# Patient Record
Sex: MALE | Race: WHITE | URBAN - METROPOLITAN AREA
[De-identification: names, ages, dates, MRNs, and addresses within clinical notes are randomized per-mention and may not be internally consistent; named-entity substitution may affect disease eponyms.]

---

## 2017-01-26 ENCOUNTER — TELEPHONE (OUTPATIENT)
Dept: FAMILY MEDICINE CLINIC | Age: 27
End: 2017-01-26

## 2017-01-26 ENCOUNTER — OFFICE VISIT (OUTPATIENT)
Dept: FAMILY MEDICINE CLINIC | Age: 27
End: 2017-01-26

## 2017-01-26 VITALS
HEIGHT: 71 IN | OXYGEN SATURATION: 98 % | WEIGHT: 173.6 LBS | DIASTOLIC BLOOD PRESSURE: 67 MMHG | RESPIRATION RATE: 18 BRPM | HEART RATE: 58 BPM | SYSTOLIC BLOOD PRESSURE: 114 MMHG | TEMPERATURE: 97.7 F | BODY MASS INDEX: 24.3 KG/M2

## 2017-01-26 DIAGNOSIS — E29.1 HYPOGONADISM MALE: ICD-10-CM

## 2017-01-26 DIAGNOSIS — F90.9 ATTENTION DEFICIT HYPERACTIVITY DISORDER (ADHD), UNSPECIFIED ADHD TYPE: Primary | ICD-10-CM

## 2017-01-26 RX ORDER — CLOMIPHENE CITRATE 50 MG/1
25 TABLET ORAL EVERY OTHER DAY
Qty: 15 TAB | Refills: 3 | Status: SHIPPED | OUTPATIENT
Start: 2017-01-26 | End: 2017-02-23

## 2017-01-26 RX ORDER — DEXTROAMPHETAMINE SACCHARATE, AMPHETAMINE ASPARTATE MONOHYDRATE, DEXTROAMPHETAMINE SULFATE AND AMPHETAMINE SULFATE 5; 5; 5; 5 MG/1; MG/1; MG/1; MG/1
20 CAPSULE, EXTENDED RELEASE ORAL
Qty: 30 CAP | Refills: 0 | Status: SHIPPED | OUTPATIENT
Start: 2017-01-26 | End: 2017-02-23 | Stop reason: SDUPTHER

## 2017-01-26 NOTE — PATIENT INSTRUCTIONS
To Do: · Start you Adderall XR on a daily basis  · Take note with regards to:  · How effective it is overall  · How long the effectiveness lasts      Notes from your doctor: You signed a controlled substances agreement during your visit, and a copy of the signed document was given to you for your personal records. Attention Deficit Hyperactivity Disorder (ADHD) in Adults: Care Instructions  Your Care Instructions  Attention deficit hyperactivity disorder, or ADHD, is a condition that makes it hard to pay attention. So you may have problems when you try to focus, get organized, and finish tasks. It might make you more active than other people. Or you might do things without thinking first.  ADHD is very common. It usually starts in early childhood. Many adults don't realize they have it until their children are diagnosed. Then they become aware of their own symptoms. Doctors don't know what causes ADHD. But it often runs in families. ADHD can be treated with medicines, behavior training, and counseling. Treatment can improve your life. Follow-up care is a key part of your treatment and safety. Be sure to make and go to all appointments, and call your doctor if you are having problems. It's also a good idea to know your test results and keep a list of the medicines you take. How can you care for yourself at home? · Learn all you can about ADHD. This will help you and your family understand it better. · Take your medicines exactly as prescribed. Call your doctor if you think you are having a problem with your medicine. You will get more details on the specific medicines your doctor prescribes. · If you miss a dose of your medicine, do not take an extra dose. · If your doctor suggests counseling, find a counselor you like and trust. Talk openly and honestly. Be willing to make some changes. · Find a support group for adults with ADHD.  Talking to others with the same problems can help you feel better. It can also give you ideas about how to best cope with the condition. · Get rid of distractions at your work space. Keep your desk clean. Try not to face a window or busy hallway. · Use files, planners, and other tools to keep you organized. · Limit use of alcohol, and do not use illegal drugs. People with ADHD tend to become addicted more easily than others. Tell your doctor if you need help to quit. Counseling, support groups, and sometimes medicines can help you stay free of alcohol or drugs. · Get at least 30 minutes of physical activity on most days of the week. Exercise has been shown to help people cope with ADHD. Walking is a good choice. You also may want to do other activities, such as running, swimming, cycling, or playing tennis or team sports. When should you call for help? Watch closely for changes in your health, and be sure to contact your doctor if:  · You feel sad a lot or cry all the time. · You have trouble sleeping, or you sleep too much. · You find it hard to concentrate, make decisions, or remember things. · You change how you normally eat. · You feel guilty for no reason. Where can you learn more? Go to http://luis-liset.info/. Enter B196 in the search box to learn more about \"Attention Deficit Hyperactivity Disorder (ADHD) in Adults: Care Instructions. \"  Current as of: July 26, 2016  Content Version: 11.1  © 8942-6313 [employer]. Care instructions adapted under license by tok tok tok (which disclaims liability or warranty for this information). If you have questions about a medical condition or this instruction, always ask your healthcare professional. [address] any warranty or liability for your use of this information.

## 2017-01-26 NOTE — TELEPHONE ENCOUNTER
Pt called saying pharmacy told he his adderral requires prior auth. Pt unsure of if he should wait at pharmacy or how long the prior auth process takes. Please call back to advise.

## 2017-01-26 NOTE — PROGRESS NOTES
Tiffanie Noguera is a 31 y.o. male here for add eval.    1. Have you been to the ER, urgent care clinic or hospitalized since your last visit? NO.     2. Have you seen or consulted any other health care providers outside of the [facility] since your last visit (Include any pap smears or colon screening)? YES  [facility]   Do you have an Advanced Directive? NO    Would you like information on Advanced Directives?  NO    Learning Assessment 1/20/2016   PRIMARY LEARNER Patient   HIGHEST LEVEL OF EDUCATION - PRIMARY LEARNER  4 YEARS OF COLLEGE   BARRIERS PRIMARY LEARNER NONE   CO-LEARNER CAREGIVER No   PRIMARY LANGUAGE ENGLISH   LEARNER PREFERENCE PRIMARY DEMONSTRATION   ANSWERED BY Patient   RELATIONSHIP SELF

## 2017-01-26 NOTE — PROGRESS NOTES
3 [facility]  Primary Care Office Visit - Problem-Oriented    : 1990   Denise Miranda is a 31 y.o. male presenting for  Chief Complaint   Patient presents with    Attention Deficit Disorder       Assessment/Plan:     1. Attention deficit hyperactivity disorder (ADHD), unspecified ADHD type  Newly diagnosed with formal neuropsych testing by JAKE Carrasco 28. Will request records. Discussed at length different mgmt options for ADHD, including once daily meds vs dosing several times per day, controlled vs not controlled. He prefers some ability to titrate meds if necessary. Will start with adderall XR 20mg daily, f/u 1 month. Denise Miranda signed a controlled substances agreement today, and a copy of the signed agreement was given to him for his personal records. - amphetamine-dextroamphetamine XR (ADDERALL XR) 20 mg XR capsule; Take 1 Cap (20 mg total) by mouth every morningIndications: ATTENTION-DEFICIT HYPERACTIVITY DISORDER. Max Daily Amount: 20 mg  Dispense: 30 Cap; Refill: 0    2. Hypogonadism male  Previously diagnosed, been off clomid for quite a while. Restart clomid. - clomiPHENE (CLOMID) 50 mg tablet; Take 0.5 Tabs by mouth every other day. Dispense: 15 Tab; Refill: 3    Spent 25min face-to-face, >50% spent on counseling & patient education. This document may have been created with the aid of dictation software. Text may contain errors, particularly phonetic errors. Reviewed management plan & instructions with patient, who voiced understanding. Solitario Rodriguez MD  Internal Medicine, Family Medicine & Sports Medicine  2017, 8:27 AM    Patient Instructions (provided in AVS): To Do:  · Start you Adderall XR on a daily basis  · Take note with regards to:  · How effective it is overall  · How long the effectiveness lasts      Notes from your doctor:   You signed a controlled substances agreement during your visit, and a copy of the signed document was given to you for your personal records. Attention Deficit Hyperactivity Disorder (ADHD) in Adults: Care Instructions      History:   Maynor Fan is a 31 y.o. male presenting to address:  Chief Complaint   Patient presents with    Attention Deficit Disorder     Was dx by JAKE Carrasco 28  However couldn't be evaluated for possible treatment until 3/20/2017. Affects him both at work and while playing ice hockey. ASRS-v1.1 Symptom Checklist  4+ selections in Part A => highly consistent with ADHD in adults & further investigation is warrented  [see scanned document]    Has been off his clomid for a long time 2/2 his insurance not covering it. He now has optima. History reviewed. No pertinent past medical history. History reviewed. No pertinent past surgical history. reports that he has never smoked. He has never used smokeless tobacco. He reports that he drinks about 1.0 oz of alcohol per week   Social History     Social History Narrative    Moved from [facility] [place] in Jan 2014 because of [employer]. Plays ice hockey regularly.     (1/13/2015)     History   Smoking Status    Never Smoker   Smokeless Tobacco    Never Used     Family History   Problem Relation Age of Onset    Cancer Paternal Grandfather      No Known Allergies    Problem List:      Patient Active Problem List    Diagnosis    Patellar tendinitis - bilateral     *7/16/2014:   - counseled at length re: management, including ice 20min/time as frequently as every hour, and HEP  - will start with HEP, and review XR results with patient via [address]; he is considering formal PT at this time  - also discussed possible future use of chopat straps, however will refrain currently because it may exacerbate acute discomfort    - B knee XR (7/16/2014): unremarkable      Hypogonadism male     *7/16/2014: on clomiphene (by urology in [address] [place] [address])         Medications:     Current Outpatient Prescriptions   Medication Sig    clomiPHENE (CLOMID) 50 mg tablet Take 0.5 Tabs by mouth every other day. TAKE 25 MG BY MOUTH EVERY OTHER DAY    multivitamin (ONE A DAY) tablet Take 1 Tab by mouth daily. No current facility-administered medications for this visit. Review of Systems:     (positives in bold)   Const: fatigue, weight change, appetite change, fevers, chills   Neuro: headaches, vision changes, dizziness, loss of consciousness, burning pain, tingling, numbness   HEENT: itchy eyes, runny eyes, red eyes, eye discharge, vision changes, light sensitivity, ear pain, ear pressure, ear popping, ear discharge/drainage, hearing change,nosebleeds, sneezing, runny nose, nasal congestion,  change in sense of smell, sore throat, voice change or hoarse voice,   dry mouth, toothache, jaw popping, difficulty swallowing, painful swallowing,   oral ulcers or canker sores   CV: chest pain, palpitations, orthopnea, PND   Pulm: dyspnea, wheezing, cough, sputum production, hemoptysis   GI: nausea, vomiting, heartburn, abdominal pain, greasy stools,   blood in stool, diarrhea, constipation   : dysuria, hematuria, change in urine, urinary frequency, urinary urgency   MSK: muscle/joint pain, joint swelling   Derm: rashes, skin changes   Allergy: seasonal allergies, itchy eyes   Heme: easy bleeding/bruising   Psych: Suicidal ideation, homicidal ideation,  Difficulty concentrating           Physical Assessment:   VS:    Vitals:    01/26/17 0823   BP: 114/67   Pulse: (!) 58   Resp: 18   Temp: 97.7 °F (36.5 °C)   TempSrc: Oral   SpO2: 98%   Weight: 173 lb 9.6 oz (78.7 kg)   Height: 5' 11\" (1.803 m)   PainSc:   0 - No pain       General:     Well-developed, well-nourished male, in NAD    Head:      PERRL, EOMI.  MMM, good dentition, oropharynx otherwise WNL. No facial asymmetry noted. Neck:      Neck supple, no thyromegaly  Cardiovasc:     No JVD. RRR, no MRG. Pulses 2+ and symmetric at distal extremities. Pulmonary:     Lungs clear bilaterally.   Normal respiratory effort. Extremities:     No edema, no TTP bilateral calves. No joint effusions. LEs warm and well-perfused. Neuro:     Alert and oriented, CNs II-XII intact, no focal deficits. Skin:      No rashes noted. Psych:    pleasant, and conversant. Affect, mood & judgment appropriate. Records Review:     Psychology Merced Barahona, PhD; 1/23/2017):    F90.2 ADHD, combined - data does meet criteria.

## 2017-01-26 NOTE — MR AVS SNAPSHOT
Visit Information Date & Time Provider Department Dept. Phone Encounter #  
 1/26/2017  8:00 AM Jennifer LockwoodAsif  [phone] [id number] Follow-up Instructions Return for 20min follow-up - ADHD med assessment. Upcoming Health Maintenance Date Due INFLUENZA AGE 9 TO ADULT 8/1/2016 DTaP/Tdap/Td series (2 - Td) 1/13/2025 Allergies as of 1/26/2017  Review Complete On: 1/26/2017 By: Jennifer Lockwood MD  
 No Known Allergies Current Immunizations  Never Reviewed Name Date Tdap 1/13/2015  9:09 AM  
  
 Not reviewed this visit You Were Diagnosed With   
  
 Codes Comments Attention deficit hyperactivity disorder (ADHD), unspecified ADHD type    -  Primary ICD-10-CM: F90.9 ICD-9-CM: 314.01 Hypogonadism male     ICD-10-CM: E29.1 ICD-9-CM: 257.2 Vitals BP Pulse Temp Resp Height(growth percentile) Weight(growth percentile) 114/67 (BP 1 Location: Right arm, BP Patient Position: Sitting) (!) 58 97.7 °F (36.5 °C) (Oral) 18 5' 11\" (1.803 m) 173 lb 9.6 oz (78.7 kg) SpO2 BMI Smoking Status 98% 24.21 kg/m2 Never Smoker BMI and BSA Data Body Mass Index Body Surface Area  
 24.21 kg/m 2 1.99 m 2 Preferred Pharmacy Pharmacy Name Phone [facility] [address] [phone] Your Updated Medication List  
  
   
This list is accurate as of: 1/26/17  8:51 AM.  Always use your most recent med list.  
  
  
  
  
 amphetamine-dextroamphetamine XR 20 mg XR capsule Commonly known as:  ADDERALL XR Take 1 Cap (20 mg total) by mouth every morningIndications: ATTENTION-DEFICIT HYPERACTIVITY DISORDER. Max Daily Amount: 20 mg  
  
 clomiPHENE 50 mg tablet Commonly known as:  CLOMID Take 0.5 Tabs by mouth every other day. multivitamin tablet Commonly known as:  ONE A DAY Take 1 Tab by mouth daily. Prescriptions Printed Refills  
 amphetamine-dextroamphetamine XR (ADDERALL XR) 20 mg XR capsule 0 Sig: Take 1 Cap (20 mg total) by mouth every morningIndications: ATTENTION-DEFICIT HYPERACTIVITY DISORDER. Max Daily Amount: 20 mg  
 Class: Print Route: Oral  
  
Prescriptions Sent to Pharmacy Refills  
 clomiPHENE (CLOMID) 50 mg tablet 3 Sig: Take 0.5 Tabs by mouth every other day. Class: Normal  
 Pharmacy: [facility] [address] [place]  #: [phone] Route: Oral  
  
Follow-up Instructions Return for 20min follow-up - ADHD med assessment. Patient Instructions To Do: 
· Start you Adderall XR on a daily basis · Take note with regards to: 
· How effective it is overall · How long the effectiveness lasts Notes from your doctor: You signed a controlled substances agreement during your visit, and a copy of the signed document was given to you for your personal records. Attention Deficit Hyperactivity Disorder (ADHD) in Adults: Care Instructions Your Care Instructions Attention deficit hyperactivity disorder, or ADHD, is a condition that makes it hard to pay attention. So you may have problems when you try to focus, get organized, and finish tasks. It might make you more active than other people. Or you might do things without thinking first. 
ADHD is very common. It usually starts in early childhood. Many adults don't realize they have it until their children are diagnosed. Then they become aware of their own symptoms. Doctors don't know what causes ADHD. But it often runs in families. ADHD can be treated with medicines, behavior training, and counseling. Treatment can improve your life. Follow-up care is a key part of your treatment and safety.  Be sure to make and go to all appointments, and call your doctor if you are having problems. It's also a good idea to know your test results and keep a list of the medicines you take. How can you care for yourself at home? · Learn all you can about ADHD. This will help you and your family understand it better. · Take your medicines exactly as prescribed. Call your doctor if you think you are having a problem with your medicine. You will get more details on the specific medicines your doctor prescribes. · If you miss a dose of your medicine, do not take an extra dose. · If your doctor suggests counseling, find a counselor you like and trust. Talk openly and honestly. Be willing to make some changes. · Find a support group for adults with ADHD. Talking to others with the same problems can help you feel better. It can also give you ideas about how to best cope with the condition. · Get rid of distractions at your work space. Keep your desk clean. Try not to face a window or busy hallway. · Use files, planners, and other tools to keep you organized. · Limit use of alcohol, and do not use illegal drugs. People with ADHD tend to become addicted more easily than others. Tell your doctor if you need help to quit. Counseling, support groups, and sometimes medicines can help you stay free of alcohol or drugs. · Get at least 30 minutes of physical activity on most days of the week. Exercise has been shown to help people cope with ADHD. Walking is a good choice. You also may want to do other activities, such as running, swimming, cycling, or playing tennis or team sports. When should you call for help? Watch closely for changes in your health, and be sure to contact your doctor if: 
· You feel sad a lot or cry all the time. · You have trouble sleeping, or you sleep too much. · You find it hard to concentrate, make decisions, or remember things. · You change how you normally eat. · You feel guilty for no reason. Where can you learn more? Go to http://luis-liset.info/. Enter B196 in the search box to learn more about \"Attention Deficit Hyperactivity Disorder (ADHD) in Adults: Care Instructions. \" Current as of: July 26, 2016 Content Version: 11.1 © 7178-0723 [employer]. Care instructions adapted under license by [employer] (which disclaims liability or warranty for this information). If you have questions about a medical condition or this instruction, always ask your healthcare professional. [address] any warranty or liability for your use of this information. Introducing [facility]! Dear Leilani Carrera: Thank you for requesting a Pricelock account. Our records indicate that you already have an active Pricelock account. You can access your account anytime at [email or web address] Hello [email or web address] Did you know that you can access your hospital and ER discharge instructions at any time in Pricelock? You can also review all of your test results from your hospital stay or ER visit. Additional Information If you have questions, please visit the Frequently Asked Questions section of the Pricelock website at [email or web address] Remember, Pricelock is NOT to be used for urgent needs. For medical emergencies, dial 911. Now available from your iPhone and Android! Please provide this summary of care documentation to your next provider. Your primary care clinician is listed as [facility]. If you have any questions after today's visit, please call [phone].

## 2017-01-26 NOTE — TELEPHONE ENCOUNTER
Received fax from pharmacy stating medication needs prior auth. Printed form and filled out part of it and put in Dr. Joyce Him box.

## 2017-01-26 NOTE — TELEPHONE ENCOUNTER
Please let him know that because of the PA required. .. We have to wait until I get his info from [employer], unless he wants to pay out-of-pocket for now. We are faxing over our info request from [employer] to them today.

## 2017-01-26 NOTE — TELEPHONE ENCOUNTER
Received ADHD diagnosis documentation from Dr. Edson Bartholomew. PA paperwork completed and faxed, and submitted for scanning.

## 2017-02-23 ENCOUNTER — OFFICE VISIT (OUTPATIENT)
Dept: FAMILY MEDICINE CLINIC | Age: 27
End: 2017-02-23

## 2017-02-23 ENCOUNTER — TELEPHONE (OUTPATIENT)
Dept: FAMILY MEDICINE CLINIC | Age: 27
End: 2017-02-23

## 2017-02-23 VITALS
DIASTOLIC BLOOD PRESSURE: 66 MMHG | SYSTOLIC BLOOD PRESSURE: 115 MMHG | BODY MASS INDEX: 24.25 KG/M2 | HEIGHT: 71 IN | RESPIRATION RATE: 18 BRPM | HEART RATE: 92 BPM | TEMPERATURE: 97.4 F | OXYGEN SATURATION: 100 % | WEIGHT: 173.2 LBS

## 2017-02-23 DIAGNOSIS — F90.9 ATTENTION DEFICIT HYPERACTIVITY DISORDER (ADHD), UNSPECIFIED ADHD TYPE: Primary | ICD-10-CM

## 2017-02-23 RX ORDER — DEXTROAMPHETAMINE SACCHARATE, AMPHETAMINE ASPARTATE MONOHYDRATE, DEXTROAMPHETAMINE SULFATE AND AMPHETAMINE SULFATE 5; 5; 5; 5 MG/1; MG/1; MG/1; MG/1
20 CAPSULE, EXTENDED RELEASE ORAL
Qty: 30 CAP | Refills: 0 | Status: SHIPPED | OUTPATIENT
Start: 2017-03-24 | End: 2017-04-24 | Stop reason: SDUPTHER

## 2017-02-23 RX ORDER — DEXTROAMPHETAMINE SACCHARATE, AMPHETAMINE ASPARTATE, DEXTROAMPHETAMINE SULFATE AND AMPHETAMINE SULFATE 2.5; 2.5; 2.5; 2.5 MG/1; MG/1; MG/1; MG/1
10 TABLET ORAL DAILY
Qty: 30 TAB | Refills: 0 | Status: SHIPPED | OUTPATIENT
Start: 2017-03-24 | End: 2017-04-24 | Stop reason: SDUPTHER

## 2017-02-23 RX ORDER — DEXTROAMPHETAMINE SACCHARATE, AMPHETAMINE ASPARTATE, DEXTROAMPHETAMINE SULFATE AND AMPHETAMINE SULFATE 2.5; 2.5; 2.5; 2.5 MG/1; MG/1; MG/1; MG/1
10 TABLET ORAL DAILY
Qty: 30 TAB | Refills: 0 | Status: SHIPPED | OUTPATIENT
Start: 2017-02-23 | End: 2017-04-24 | Stop reason: SDUPTHER

## 2017-02-23 RX ORDER — DEXTROAMPHETAMINE SACCHARATE, AMPHETAMINE ASPARTATE MONOHYDRATE, DEXTROAMPHETAMINE SULFATE AND AMPHETAMINE SULFATE 5; 5; 5; 5 MG/1; MG/1; MG/1; MG/1
20 CAPSULE, EXTENDED RELEASE ORAL
Qty: 30 CAP | Refills: 0 | Status: SHIPPED | OUTPATIENT
Start: 2017-02-23 | End: 2017-04-24 | Stop reason: SDUPTHER

## 2017-02-23 NOTE — PATIENT INSTRUCTIONS
- add your 10mg immediate release to your regimen, but remember not to take it too late in the evening!!

## 2017-02-23 NOTE — TELEPHONE ENCOUNTER
Spoke to insurance and had them add to other prior Edger Evelyn. Approved called and left message informing patient.

## 2017-02-23 NOTE — PROGRESS NOTES
Lilly Patino is a 31 y.o. male here for a medication follow up. 1. Have you been to the ER, urgent care clinic or hospitalized since your last visit? NO.     2. Have you seen or consulted any other health care providers outside of the [address] [place] [address] since your last visit (Include any pap smears or colon screening)? NO      Do you have an Advanced Directive? NO    Would you like information on Advanced Directives?  NO    Learning Assessment 1/20/2016   PRIMARY LEARNER Patient   HIGHEST LEVEL OF EDUCATION - PRIMARY LEARNER  4 YEARS OF COLLEGE   BARRIERS PRIMARY LEARNER NONE   CO-LEARNER CAREGIVER No   PRIMARY LANGUAGE ENGLISH   LEARNER PREFERENCE PRIMARY DEMONSTRATION   ANSWERED BY Patient   RELATIONSHIP SELF

## 2017-02-23 NOTE — PROGRESS NOTES
3 [facility]  Primary Care Office Visit - Problem-Oriented    : 1990   Jonah Gross is a 31 y.o. male presenting for  Chief Complaint   Patient presents with    Medication Evaluation       Assessment/Plan:     1. Attention deficit hyperactivity disorder (ADHD), unspecified ADHD type  Ongoing, not quite controlled. Needs additional coverage towards the end of the work day. I have reviewed & decided to continue adderall XR 20mg qam.  Add adderall IR 10mg q3pm.  F/u 2mo. - amphetamine-dextroamphetamine XR (ADDERALL XR) 20 mg XR capsule; Take 1 Cap (20 mg total) by mouth every morningEarliest Fill Date: 3/24/17. Max Daily Amount: 20 mg  Dispense: 30 Cap; Refill: 0  - dextroamphetamine-amphetamine (ADDERALL) 10 mg tablet; Take 1 Tab (10 mg total) by mouth dailyEarliest Fill Date: 3/24/17. At 3pm Max Daily Amount: 10 mg  Dispense: 30 Tab; Refill: 0  - amphetamine-dextroamphetamine XR (ADDERALL XR) 20 mg XR capsule; Take 1 Cap (20 mg total) by mouth every morning. Max Daily Amount: 20 mg  Dispense: 30 Cap; Refill: 0  - dextroamphetamine-amphetamine (ADDERALL) 10 mg tablet; Take 1 Tab (10 mg total) by mouth daily. At 3pm Max Daily Amount: 10 mg  Dispense: 30 Tab; Refill: 0  I have reviewed this patient's report generated by the [place] which does not demonstrate aberrancies and inconsistencies with regard to the historical prescribing of controlled medications to this patient by other providers. This document may have been created with the aid of dictation software. Text may contain errors, particularly phonetic errors. Reviewed management plan & instructions with patient, who voiced understanding.     Cande Red MD  Internal Medicine, Family Medicine & Sports Medicine  2017, 8:24 AM    History:   Jonah Gross is a 31 y.o. male presenting to address:  Chief Complaint   Patient presents with    Medication Evaluation       Current ADHD med generally lasts 6-7 hours. So longer days he needs some coverage. No complaints of sleep disturbances, depression, headache, stomachache, appetite suppression, or elevated heart rate or elevated blood pressure. Denies any side effects. Notes that his insurance will still not cover clomid. History reviewed. No pertinent past medical history. History reviewed. No pertinent surgical history. reports that he has never smoked. He has never used smokeless tobacco. He reports that he drinks about 1.0 oz of alcohol per week   Social History     Social History Narrative    Moved from [address] [place] [address] in Jan 2014 because of [employer]. Plays ice hockey regularly. (1/13/2015)     History   Smoking Status    Never Smoker   Smokeless Tobacco    Never Used     Family History   Problem Relation Age of Onset    Cancer Paternal Grandfather      No Known Allergies    Problem List:      Patient Active Problem List    Diagnosis    Attention deficit hyperactivity disorder (ADHD)     Dx by Teresa Dougherty, PhD, 1/23/2017  (see scanned documentation)      Patellar tendinitis - bilateral     *7/16/2014:   - counseled at length re: management, including ice 20min/time as frequently as every hour, and HEP  - will start with HEP, and review XR results with patient via [address]; he is considering formal PT at this time  - also discussed possible future use of chopat straps, however will refrain currently because it may exacerbate acute discomfort    - B knee XR (7/16/2014): unremarkable      Hypogonadism male     *7/16/2014: on clomiphene (by urology in [address] [place] [address])         Medications:     Current Outpatient Prescriptions   Medication Sig    amphetamine-dextroamphetamine XR (ADDERALL XR) 20 mg XR capsule Take 1 Cap (20 mg total) by mouth every morningIndications: ATTENTION-DEFICIT HYPERACTIVITY DISORDER. Max Daily Amount: 20 mg    clomiPHENE (CLOMID) 50 mg tablet Take 0.5 Tabs by mouth every other day.     multivitamin (ONE A DAY) tablet Take 1 Tab by mouth daily. No current facility-administered medications for this visit. Review of Systems:     Review of Systems   Constitutional: Negative for weight loss. Respiratory: Negative for shortness of breath. Cardiovascular: Negative for chest pain and palpitations. Gastrointestinal: Negative for abdominal pain. Neurological: Negative for dizziness, tingling, tremors, seizures and headaches. Psychiatric/Behavioral: Negative for depression. The patient is not nervous/anxious and does not have insomnia. Physical Assessment:   VS:    Vitals:    02/23/17 0821   BP: 115/66   Pulse: 92   Resp: 18   Temp: 97.4 °F (36.3 °C)   TempSrc: Oral   SpO2: 100%   Weight: 173 lb 3.2 oz (78.6 kg)   Height: 5' 11\" (1.803 m)   PainSc:   0 - No pain       General:     Well-developed, well-nourished male, in NAD    Head:      No facial asymmetry noted. Cardiovasc:     No JVD. RRR, no MRG. Pulses 2+ and symmetric at distal extremities. Pulmonary:     Lungs clear bilaterally. Normal respiratory effort. Extremities:     No edema, no TTP bilateral calves. No joint effusions. LEs warm and well-perfused. Neuro:     Alert and oriented, CNs II-XII intact, no focal deficits. Skin:      No rashes noted. Psych:    pleasant, and conversant. Affect, mood & judgment appropriate.

## 2017-02-23 NOTE — MR AVS SNAPSHOT
Visit Information Date & Time Provider Department Dept. Phone Encounter #  
 2/23/2017  8:00 AM [phone] [id number] Follow-up Instructions Return in about 2 months (around 4/23/2017) for 20min follow-up. Upcoming Health Maintenance Date Due DTaP/Tdap/Td series (2 - Td) 1/13/2025 Allergies as of 2/23/2017  Review Complete On: 2/23/2017 By: Brandon Simmons MD  
 No Known Allergies Current Immunizations  Never Reviewed Name Date Tdap 1/13/2015  9:09 AM  
  
 Not reviewed this visit You Were Diagnosed With   
  
 Codes Comments Attention deficit hyperactivity disorder (ADHD), unspecified ADHD type    -  Primary ICD-10-CM: F90.9 ICD-9-CM: 314.01 Vitals BP  
  
  
  
  
  
 115/66 (BP 1 Location: Right arm, BP Patient Position: Sitting) Vitals History BMI and BSA Data Body Mass Index Body Surface Area  
 24.16 kg/m 2 1.98 m 2 Preferred Pharmacy Pharmacy Name Phone [facility] [address] [facility] [place] [address] [phone] Your Updated Medication List  
  
   
This list is accurate as of: 2/23/17  8:36 AM.  Always use your most recent med list.  
  
  
  
  
 * amphetamine-dextroamphetamine XR 20 mg XR capsule Commonly known as:  ADDERALL XR Take 1 Cap (20 mg total) by mouth every morning. Max Daily Amount: 20 mg  
  
 * dextroamphetamine-amphetamine 10 mg tablet Commonly known as:  ADDERALL Take 1 Tab (10 mg total) by mouth daily. At 3pm Max Daily Amount: 10 mg  
  
 * amphetamine-dextroamphetamine XR 20 mg XR capsule Commonly known as:  ADDERALL XR Take 1 Cap (20 mg total) by mouth every morningEarliest Fill Date: 3/24/17. Max Daily Amount: 20 mg  
Start taking on:  3/24/2017 * dextroamphetamine-amphetamine 10 mg tablet Commonly known as:  ADDERALL Take 1 Tab (10 mg total) by mouth dailyEarliest Fill Date: 3/24/17. At 3pm Max Daily Amount: 10 mg  
Start taking on:  3/24/2017 * Notice: This list has 4 medication(s) that are the same as other medications prescribed for you. Read the directions carefully, and ask your doctor or other care provider to review them with you. Prescriptions Printed Refills  
 amphetamine-dextroamphetamine XR (ADDERALL XR) 20 mg XR capsule 0 Starting on: 3/24/2017 Sig: Take 1 Cap (20 mg total) by mouth every morningEarliest Fill Date: 3/24/17. Max Daily Amount: 20 mg  
 Class: Print Route: Oral  
 dextroamphetamine-amphetamine (ADDERALL) 10 mg tablet 0 Starting on: 3/24/2017 Sig: Take 1 Tab (10 mg total) by mouth dailyEarliest Fill Date: 3/24/17. At 3pm Max Daily Amount: 10 mg  
 Class: Print Route: Oral  
 amphetamine-dextroamphetamine XR (ADDERALL XR) 20 mg XR capsule 0 Sig: Take 1 Cap (20 mg total) by mouth every morning. Max Daily Amount: 20 mg  
 Class: Print Route: Oral  
 dextroamphetamine-amphetamine (ADDERALL) 10 mg tablet 0 Sig: Take 1 Tab (10 mg total) by mouth daily. At 3pm Max Daily Amount: 10 mg  
 Class: Print Route: Oral  
  
Follow-up Instructions Return in about 2 months (around 4/23/2017) for 20min follow-up. Patient Instructions   
- add your 10mg immediate release to your regimen, but remember not to take it too late in the evening!! 
 
 
 
  
Introducing [facility] SERVICES! Dear Veronique Venu: Thank you for requesting a MainOne account. Our records indicate that you already have an active MainOne account. You can access your account anytime at [email or web address] Did you know that you can access your hospital and ER discharge instructions at any time in MainOne? You can also review all of your test results from your hospital stay or ER visit. Additional Information If you have questions, please visit the Frequently Asked Questions section of the [email or web address] website at [email or web address] Remember, [employer]wlient is NOT to be used for urgent needs. For medical emergencies, dial 911. Now available from your iPhone and Android! Please provide this summary of care documentation to your next provider. Your primary care clinician is listed as Lauren Subramanian. If you have any questions after today's visit, please call [phone].

## 2017-04-24 ENCOUNTER — OFFICE VISIT (OUTPATIENT)
Dept: FAMILY MEDICINE CLINIC | Age: 27
End: 2017-04-24

## 2017-04-24 VITALS
TEMPERATURE: 97.9 F | HEIGHT: 71 IN | HEART RATE: 68 BPM | RESPIRATION RATE: 16 BRPM | BODY MASS INDEX: 24.08 KG/M2 | SYSTOLIC BLOOD PRESSURE: 134 MMHG | DIASTOLIC BLOOD PRESSURE: 72 MMHG | OXYGEN SATURATION: 99 % | WEIGHT: 172 LBS

## 2017-04-24 DIAGNOSIS — Z13.1 SCREENING FOR DIABETES MELLITUS: ICD-10-CM

## 2017-04-24 DIAGNOSIS — E29.1 HYPOGONADISM MALE: ICD-10-CM

## 2017-04-24 DIAGNOSIS — Z13.21 ENCOUNTER FOR VITAMIN DEFICIENCY SCREENING: ICD-10-CM

## 2017-04-24 DIAGNOSIS — Z13.29 SCREENING FOR THYROID DISORDER: ICD-10-CM

## 2017-04-24 DIAGNOSIS — F90.9 ATTENTION DEFICIT HYPERACTIVITY DISORDER (ADHD), UNSPECIFIED ADHD TYPE: Primary | ICD-10-CM

## 2017-04-24 RX ORDER — DEXTROAMPHETAMINE SACCHARATE, AMPHETAMINE ASPARTATE, DEXTROAMPHETAMINE SULFATE AND AMPHETAMINE SULFATE 2.5; 2.5; 2.5; 2.5 MG/1; MG/1; MG/1; MG/1
10 TABLET ORAL DAILY
Qty: 30 TAB | Refills: 0 | Status: SHIPPED | OUTPATIENT
Start: 2017-05-23

## 2017-04-24 RX ORDER — DEXTROAMPHETAMINE SACCHARATE, AMPHETAMINE ASPARTATE MONOHYDRATE, DEXTROAMPHETAMINE SULFATE AND AMPHETAMINE SULFATE 5; 5; 5; 5 MG/1; MG/1; MG/1; MG/1
20 CAPSULE, EXTENDED RELEASE ORAL
Qty: 30 CAP | Refills: 0 | Status: SHIPPED | OUTPATIENT
Start: 2017-05-23

## 2017-04-24 RX ORDER — DEXTROAMPHETAMINE SACCHARATE, AMPHETAMINE ASPARTATE, DEXTROAMPHETAMINE SULFATE AND AMPHETAMINE SULFATE 2.5; 2.5; 2.5; 2.5 MG/1; MG/1; MG/1; MG/1
10 TABLET ORAL DAILY
Qty: 30 TAB | Refills: 0 | Status: SHIPPED | OUTPATIENT
Start: 2017-04-24

## 2017-04-24 RX ORDER — DEXTROAMPHETAMINE SACCHARATE, AMPHETAMINE ASPARTATE, DEXTROAMPHETAMINE SULFATE AND AMPHETAMINE SULFATE 2.5; 2.5; 2.5; 2.5 MG/1; MG/1; MG/1; MG/1
10 TABLET ORAL DAILY
Qty: 30 TAB | Refills: 0 | Status: SHIPPED | OUTPATIENT
Start: 2017-06-21

## 2017-04-24 RX ORDER — DEXTROAMPHETAMINE SACCHARATE, AMPHETAMINE ASPARTATE MONOHYDRATE, DEXTROAMPHETAMINE SULFATE AND AMPHETAMINE SULFATE 5; 5; 5; 5 MG/1; MG/1; MG/1; MG/1
20 CAPSULE, EXTENDED RELEASE ORAL
Qty: 30 CAP | Refills: 0 | Status: SHIPPED | OUTPATIENT
Start: 2017-04-24

## 2017-04-24 RX ORDER — DEXTROAMPHETAMINE SACCHARATE, AMPHETAMINE ASPARTATE MONOHYDRATE, DEXTROAMPHETAMINE SULFATE AND AMPHETAMINE SULFATE 5; 5; 5; 5 MG/1; MG/1; MG/1; MG/1
20 CAPSULE, EXTENDED RELEASE ORAL
Qty: 30 CAP | Refills: 0 | Status: SHIPPED | OUTPATIENT
Start: 2017-06-21

## 2017-04-24 RX ORDER — CLOMIPHENE CITRATE 50 MG/1
25 TABLET ORAL DAILY
COMMUNITY

## 2017-04-24 NOTE — PROGRESS NOTES
3 [facility]  Primary Care Office Visit - Problem-Oriented    : 1990   Myriam Parker is a 31 y.o. male presenting for  Chief Complaint   Patient presents with    Behavioral Problem       Assessment/Plan:     1. Attention deficit hyperactivity disorder (ADHD), unspecified ADHD type  Well controlled on current regimen. I have reviewed this patient's report generated by the [place] which does not demonstrate aberrancies and inconsistencies with regard to the historical prescribing of controlled medications to this patient by other providers. I have reviewed & decided to continue adderall XR 20mg qam, and adderall IR 10mg q3pm.  Provided patient ADHD Dx documentation since he is moving back to [address] [place] [address] this weekend. - amphetamine-dextroamphetamine XR (ADDERALL XR) 20 mg XR capsule; Take 1 Cap (20 mg total) by mouth every morningEarliest Fill Date: 17. Max Daily Amount: 20 mg  Dispense: 30 Cap; Refill: 0  - dextroamphetamine-amphetamine (ADDERALL) 10 mg tablet; Take 1 Tab (10 mg total) by mouth dailyEarliest Fill Date: 17. At 3pm Max Daily Amount: 10 mg  Dispense: 30 Tab; Refill: 0  - amphetamine-dextroamphetamine XR (ADDERALL XR) 20 mg XR capsule; Take 1 Cap (20 mg total) by mouth every morningEarliest Fill Date: 17. Max Daily Amount: 20 mg  Dispense: 30 Cap; Refill: 0  - dextroamphetamine-amphetamine (ADDERALL) 10 mg tablet; Take 1 Tab (10 mg total) by mouth dailyEarliest Fill Date: 17. At 3pm Max Daily Amount: 10 mg  Dispense: 30 Tab; Refill: 0  - amphetamine-dextroamphetamine XR (ADDERALL XR) 20 mg XR capsule; Take 1 Cap (20 mg total) by mouth every morning. Max Daily Amount: 20 mg  Dispense: 30 Cap; Refill: 0  - dextroamphetamine-amphetamine (ADDERALL) 10 mg tablet; Take 1 Tab (10 mg total) by mouth daily. At 3pm Max Daily Amount: 10 mg  Dispense: 30 Tab; Refill: 0    2.  Hypogonadism male  Ongoing, not well controlled. Intolerant of clomid 25mg every other day 2/2 unacceptable side effects of mood swings and achne. - CBC WITH AUTOMATED DIFF; Future  - FSH AND LH; Future  - TESTOSTERONE, FREE & TOTAL; Future  - METABOLIC PANEL, COMPREHENSIVE; Future    3. Screening for thyroid disorder  - TSH 3RD GENERATION; Future  - T4, FREE; Future    4. Screening for diabetes mellitus  - HEMOGLOBIN A1C WITH EAG; Future    5. Encounter for vitamin deficiency screening  - VITAMIN D, 25 HYDROXY; Future    Spent 25min face-to-face, >50% spent on counseling & patient education. This document may have been created with the aid of dictation software. Text may contain errors, particularly phonetic errors. Reviewed management plan & instructions with patient, who voiced understanding. Casey Tang MD  Internal Medicine, Family Medicine & Sports Medicine  4/24/2017, 8:09 AM    Patient Instructions (provided in AVS): To Do:  · bloodwork on your way out      Notes from your doctor:  · Gave you copies of your ADHD diagnosis documentation, as well as the prior authorization paperwork for [facility] for your ADHD meds  · Good luck back home! History:   Carlin Farias is a 31 y.o. male presenting to address:  Chief Complaint   Patient presents with    Behavioral Problem     ADHD:  Using the 10mg in the afternoon about 50% of the days of the week, and likes the flexibility of prn use, and denies any insomnia with that 2nd dose. Hypogonadism:  Paid out of pocket for his clomid, and tried getting back on the clomid x 1 week but it caused unacceptable side effects, including worsening acne, mood swings. Thus, stopped after 1 week, and SE resolved. Is feeling fatigued, and is wondering if it is all 2/2 hypogonadism or if it is 2/2 something else. History reviewed. No pertinent past medical history. History reviewed. No pertinent surgical history. reports that he has never smoked.  He has never used smokeless tobacco. He reports that he drinks about 1.0 oz of alcohol per week   Social History     Social History Narrative    Moved from [address] [place] [address] in Jan 2014 because of [employer] promotion. Plays ice hockey regularly. (1/13/2015)     History   Smoking Status    Never Smoker   Smokeless Tobacco    Never Used     Family History   Problem Relation Age of Onset    Cancer Paternal Grandfather      No Known Allergies    Problem List:      Patient Active Problem List    Diagnosis    Attention deficit hyperactivity disorder (ADHD)     Dx by Marcellus Swan, PhD, 1/23/2017  (see scanned documentation)      Patellar tendinitis - bilateral     *7/16/2014:   - counseled at length re: management, including ice 20min/time as frequently as every hour, and HEP  - will start with HEP, and review XR results with patient via [address]; he is considering formal PT at this time  - also discussed possible future use of chopat straps, however will refrain currently because it may exacerbate acute discomfort    - B knee XR (7/16/2014): unremarkable      Hypogonadism male     *7/16/2014: on clomiphene (by urology in [address] [place] [address])         Medications:     Current Outpatient Prescriptions   Medication Sig    clomiPHENE (CLOMID) 50 mg tablet Take 25 mg by mouth daily. Every  Other day    [START ON 6/21/2017] amphetamine-dextroamphetamine XR (ADDERALL XR) 20 mg XR capsule Take 1 Cap (20 mg total) by mouth every morningEarliest Fill Date: 6/21/17. Max Daily Amount: 20 mg    [START ON 6/21/2017] dextroamphetamine-amphetamine (ADDERALL) 10 mg tablet Take 1 Tab (10 mg total) by mouth dailyEarliest Fill Date: 6/21/17. At 3pm Max Daily Amount: 10 mg    [START ON 5/23/2017] amphetamine-dextroamphetamine XR (ADDERALL XR) 20 mg XR capsule Take 1 Cap (20 mg total) by mouth every morningEarliest Fill Date: 5/23/17.   Max Daily Amount: 20 mg    [START ON 5/23/2017] dextroamphetamine-amphetamine (ADDERALL) 10 mg tablet Take 1 Tab (10 mg total) by mouth dailyEarliest Fill Date: 5/23/17. At 3pm Max Daily Amount: 10 mg    amphetamine-dextroamphetamine XR (ADDERALL XR) 20 mg XR capsule Take 1 Cap (20 mg total) by mouth every morning. Max Daily Amount: 20 mg    dextroamphetamine-amphetamine (ADDERALL) 10 mg tablet Take 1 Tab (10 mg total) by mouth daily. At 3pm Max Daily Amount: 10 mg     No current facility-administered medications for this visit.         Review of Systems:     (positives in bold)   Constitutional: fatigue, weight change, appetite change, fevers, chills   Eyes: itchy eyes, runny eyes, red eyes, eye discharge, vision changes, light sensitivity   Neuro: headaches, vision changes, dizziness, loss of consciousness, burning pain, tingling, numbness   ENT: ear pain, ear pressure, ear popping, ear discharge/drainage, hearing change,nosebleeds, sneezing, runny nose, nasal congestion,  change in sense of smell, sore throat, voice change or hoarse voice,   dry mouth, toothache, jaw popping, difficulty swallowing, painful swallowing,   oral ulcers or canker sores   Cardiovascular: chest pain, palpitations, orthopnea, PND   Respiratory: dyspnea, wheezing, cough, sputum production, hemoptysis   GI: nausea, vomiting, heartburn, abdominal pain, greasy stools,   blood in stool, diarrhea, constipation   : dysuria, hematuria, change in urine, urinary frequency, urinary urgency   MSK: muscle/joint pain, joint swelling   Derm: rashes, skin changes   Allergy/Imm: seasonal allergies, itchy eyes   Endocrine: Polyuria, polydipsia, polyphagia, heat intolerance, cold intolerance   Heme/lymph: easy bleeding/bruising   Psych: Suicidal ideation, homicidal ideation           Physical Assessment:   VS:    Vitals:    04/24/17 0811   BP: 134/72   Pulse: 68   Resp: 16   Temp: 97.9 °F (36.6 °C)   TempSrc: Oral   SpO2: 99%   Weight: 172 lb (78 kg)   Height: 5' 11\" (1.803 m)   PainSc:   0 - No pain       General:     Well-developed, well-nourished male, in NAD    Head:      PERRL, EOMI.  MMM, good dentition, oropharynx otherwise WNL. No facial asymmetry noted. Neck:      Neck supple, no thyromegaly  Cardiovasc:     No JVD. RRR, no MRG. Pulses 2+ and symmetric at distal extremities. Pulmonary:     Lungs clear bilaterally. Normal respiratory effort. Extremities:     No edema, no TTP bilateral calves. No joint effusions. LEs warm and well-perfused. Neuro:     Alert and oriented, CNs II-XII intact, no focal deficits. Skin:      No rashes noted. Psych:    pleasant, and conversant. Affect, mood & judgment appropriate.

## 2017-04-24 NOTE — PROGRESS NOTES
Elaine Roman is a 31 y.o. male here today for 2 month follow up. 1. Have you been to the ER, urgent care clinic or hospitalized since your last visit? NO.     2. Have you seen or consulted any other health care providers outside of the [address] [place] [address] since your last visit (Include any pap smears or colon screening)? NO      Do you have an Advanced Directive? NO    Would you like information on Advanced Directives?  NO    Learning Assessment 1/20/2016   PRIMARY LEARNER Patient   HIGHEST LEVEL OF EDUCATION - PRIMARY LEARNER  4 YEARS OF COLLEGE   BARRIERS PRIMARY LEARNER NONE   CO-LEARNER CAREGIVER No   PRIMARY LANGUAGE ENGLISH   LEARNER PREFERENCE PRIMARY DEMONSTRATION   ANSWERED BY Patient   RELATIONSHIP SELF

## 2017-04-24 NOTE — MR AVS SNAPSHOT
Visit Information Date & Time Provider Department Dept. Phone Encounter #  
 4/24/2017  8:00 AM Cecily Luis, [place] [facility] [phone] [id number] Upcoming Health Maintenance Date Due DTaP/Tdap/Td series (2 - Td) 1/13/2025 Allergies as of 4/24/2017  Review Complete On: 4/24/2017 By: Cecily Luis MD  
 No Known Allergies Current Immunizations  Never Reviewed Name Date Tdap 1/13/2015  9:09 AM  
  
 Not reviewed this visit You Were Diagnosed With   
  
 Codes Comments Attention deficit hyperactivity disorder (ADHD), unspecified ADHD type    -  Primary ICD-10-CM: F90.9 ICD-9-CM: 314.01 Hypogonadism male     ICD-10-CM: E29.1 ICD-9-CM: 257.2 Screening for thyroid disorder     ICD-10-CM: Z13.29 ICD-9-CM: V77.0 Screening for diabetes mellitus     ICD-10-CM: Z13.1 ICD-9-CM: V77.1 Encounter for vitamin deficiency screening     ICD-10-CM: Z13.21 ICD-9-CM: V77.99 Vitals BP Pulse Temp Resp Height(growth percentile) Weight(growth percentile) 134/72 (BP 1 Location: Left arm, BP Patient Position: Sitting) 68 97.9 °F (36.6 °C) (Oral) 16 5' 11\" (1.803 m) 172 lb (78 kg) SpO2 BMI Smoking Status 99% 23.99 kg/m2 Never Smoker BMI and BSA Data Body Mass Index Body Surface Area  
 23.99 kg/m 2 1.98 m 2 Preferred Pharmacy Pharmacy Name Phone Mary  [address] [facility] [address] [address] [phone] Your Updated Medication List  
  
   
This list is accurate as of: 4/24/17  8:31 AM.  Always use your most recent med list.  
  
  
  
  
 * amphetamine-dextroamphetamine XR 20 mg XR capsule Commonly known as:  ADDERALL XR Take 1 Cap (20 mg total) by mouth every morning. Max Daily Amount: 20 mg  
  
 * dextroamphetamine-amphetamine 10 mg tablet Commonly known as:  ADDERALL Take 1 Tab (10 mg total) by mouth daily. At 3pm Max Daily Amount: 10 mg  
  
 * amphetamine-dextroamphetamine XR 20 mg XR capsule Commonly known as:  ADDERALL XR Take 1 Cap (20 mg total) by mouth every morningEarliest Fill Date: 5/23/17. Max Daily Amount: 20 mg  
Start taking on:  5/23/2017 * dextroamphetamine-amphetamine 10 mg tablet Commonly known as:  ADDERALL Take 1 Tab (10 mg total) by mouth dailyEarliest Fill Date: 5/23/17. At 3pm Max Daily Amount: 10 mg  
Start taking on:  5/23/2017 * amphetamine-dextroamphetamine XR 20 mg XR capsule Commonly known as:  ADDERALL XR Take 1 Cap (20 mg total) by mouth every morningEarliest Fill Date: 6/21/17. Max Daily Amount: 20 mg  
Start taking on:  6/21/2017 * dextroamphetamine-amphetamine 10 mg tablet Commonly known as:  ADDERALL Take 1 Tab (10 mg total) by mouth dailyEarliest Fill Date: 6/21/17. At 3pm Max Daily Amount: 10 mg  
Start taking on:  6/21/2017  
  
 clomiPHENE 50 mg tablet Commonly known as:  CLOMID Take 25 mg by mouth daily. Every  Other day * Notice: This list has 6 medication(s) that are the same as other medications prescribed for you. Read the directions carefully, and ask your doctor or other care provider to review them with you. Prescriptions Printed Refills  
 amphetamine-dextroamphetamine XR (ADDERALL XR) 20 mg XR capsule 0 Starting on: 6/21/2017 Sig: Take 1 Cap (20 mg total) by mouth every morningEarliest Fill Date: 6/21/17. Max Daily Amount: 20 mg  
 Class: Print Route: Oral  
 dextroamphetamine-amphetamine (ADDERALL) 10 mg tablet 0 Starting on: 6/21/2017 Sig: Take 1 Tab (10 mg total) by mouth dailyEarliest Fill Date: 6/21/17. At 3pm Max Daily Amount: 10 mg  
 Class: Print Route: Oral  
 amphetamine-dextroamphetamine XR (ADDERALL XR) 20 mg XR capsule 0 Starting on: 5/23/2017  Sig: Take 1 Cap (20 mg total) by mouth every morningEarliest Fill Date: 5/23/17. Max Daily Amount: 20 mg  
 Class: Print Route: Oral  
 dextroamphetamine-amphetamine (ADDERALL) 10 mg tablet 0 Starting on: 5/23/2017 Sig: Take 1 Tab (10 mg total) by mouth dailyEarliest Fill Date: 5/23/17. At 3pm Max Daily Amount: 10 mg  
 Class: Print Route: Oral  
 amphetamine-dextroamphetamine XR (ADDERALL XR) 20 mg XR capsule 0 Sig: Take 1 Cap (20 mg total) by mouth every morning. Max Daily Amount: 20 mg  
 Class: Print Route: Oral  
 dextroamphetamine-amphetamine (ADDERALL) 10 mg tablet 0 Sig: Take 1 Tab (10 mg total) by mouth daily. At 3pm Max Daily Amount: 10 mg  
 Class: Print Route: Oral  
  
To-Do List   
 04/24/2017 Lab:  CBC WITH AUTOMATED DIFF   
  
 04/24/2017 Lab:  [facility] AND [address]   
  
 04/24/2017 Lab:  HEMOGLOBIN A1C WITH EAG   
  
 04/24/2017 Lab:  METABOLIC PANEL, COMPREHENSIVE   
  
 04/24/2017 Lab:  T4, FREE   
  
 04/24/2017 Lab:  TESTOSTERONE, FREE & TOTAL   
  
 04/24/2017 Lab:  TSH 3RD GENERATION   
  
 04/24/2017 Lab:  VITAMIN D, 25 HYDROXY Patient Instructions To Do: 
· bloodwork on your way out Notes from your doctor: · Gave you copies of your ADHD diagnosis documentation, as well as the prior authorization paperwork for [employer] for your ADHD meds · Good luck back home! Introducing [facility]! Dear Noe Jaeger: Thank you for requesting a [employer] account. Our records indicate that you already have an active [employer] account. You can access your account anytime at [email or web address] Did you know that you can access your hospital and ER discharge instructions at any time in [employer]? You can also review all of your test results from your hospital stay or ER visit. Additional Information If you have questions, please visit the Frequently Asked Questions section of the [employer] website at [email or web address] Remember, MyChart is NOT to be used for urgent needs. For medical emergencies, dial 911. Now available from your iPhone and Android! Please provide this summary of care documentation to your next provider. Your primary care clinician is listed as Nick Ponce. If you have any questions after today's visit, please call [phone].

## 2017-04-24 NOTE — PATIENT INSTRUCTIONS
To Do:  · bloodwork on your way out      Notes from your doctor:  · Gave you copies of your ADHD diagnosis documentation, as well as the prior authorization paperwork for [place] for your ADHD meds  · Good luck back home!

## 2017-04-27 LAB
% FREE TESTOSTERONE: 3.45 %
25(OH)D3 SERPL-MCNC: 21.8 NG/ML (ref 32–100)
A-G RATIO,AGRAT: 2.3 RATIO (ref 1.1–2.6)
ABSOLUTE LYMPHOCYTE COUNT, 10803: 2.1 K/UL (ref 1–4.8)
ALBUMIN SERPL-MCNC: 4.3 G/DL (ref 3.5–5)
ALP SERPL-CCNC: 51 U/L (ref 25–115)
ALT SERPL-CCNC: 26 U/L (ref 5–40)
ANION GAP SERPL CALC-SCNC: 17 MMOL/L
AST SERPL W P-5'-P-CCNC: 33 U/L (ref 10–37)
AVG GLU, 10930: 110 MG/DL (ref 91–123)
BASOPHILS # BLD: 0 K/UL (ref 0–0.2)
BASOPHILS NFR BLD: 0 % (ref 0–2)
BILIRUB SERPL-MCNC: 0.5 MG/DL (ref 0.2–1.2)
BUN SERPL-MCNC: 21 MG/DL (ref 6–22)
CALCIUM SERPL-MCNC: 9.4 MG/DL (ref 8.4–10.4)
CHLORIDE SERPL-SCNC: 100 MMOL/L (ref 98–110)
CO2 SERPL-SCNC: 26 MMOL/L (ref 20–32)
COMMENT, 64792: ABNORMAL
CREAT SERPL-MCNC: 1.2 MG/DL (ref 0.5–1.2)
EOSINOPHIL # BLD: 0.5 K/UL (ref 0–0.5)
EOSINOPHIL NFR BLD: 8 % (ref 0–6)
ERYTHROCYTE [DISTWIDTH] IN BLOOD BY AUTOMATED COUNT: 12.6 % (ref 10–16)
FREE TESTOSTERONE,DIRECT, 144981: 8.76 NG/DL
FSH SERPL-ACNC: 1.6 MIU/ML (ref 1.5–12.4)
GFRAA, 66117: >60
GFRNA, 66118: >60
GLOBULIN,GLOB: 1.9 G/DL (ref 2–4)
GLUCOSE SERPL-MCNC: 89 MG/DL (ref 65–99)
GRANULOCYTES,GRANS: 42 % (ref 40–75)
HBA1C MFR BLD HPLC: 5.5 % (ref 4.8–5.9)
HCT VFR BLD AUTO: 43.8 % (ref 36.6–51.9)
HGB BLD-MCNC: 14.5 G/DL (ref 13.2–17.3)
LH SERPL-ACNC: 4.5 MIU/ML (ref 1.7–8.6)
LYMPHOCYTES, LYMLT: 39 % (ref 27–45)
MCH RBC QN AUTO: 33 PG (ref 26–34)
MCHC RBC AUTO-ENTMCNC: 33 G/DL (ref 32–36)
MCV RBC AUTO: 98 FL (ref 80–95)
MONOCYTES # BLD: 0.6 K/UL (ref 0.1–0.9)
MONOCYTES NFR BLD: 11 % (ref 3–9)
NEUTROPHILS # BLD AUTO: 2.3 K/UL (ref 1.8–7.7)
PLATELET # BLD AUTO: 213 K/UL (ref 140–440)
PMV BLD AUTO: 10.4 FL (ref 6–10.8)
POTASSIUM SERPL-SCNC: 3.9 MMOL/L (ref 3.5–5.5)
PROT SERPL-MCNC: 6.2 G/DL (ref 6.4–8.3)
RBC # BLD AUTO: 4.46 M/UL (ref 3.8–5.8)
SODIUM SERPL-SCNC: 143 MMOL/L (ref 133–145)
T4 FREE SERPL-MCNC: 1.2 NG/DL (ref 0.9–1.8)
TESTOSTERONE: 254 NG/DL
TSH SERPL DL<=0.005 MIU/L-ACNC: 2.07 MCU/ML (ref 0.27–4.2)
WBC # BLD AUTO: 5.5 K/UL (ref 4–11)

## 2017-04-27 NOTE — PROGRESS NOTES
Low T (previously dx, not tolerant of clomid)  Mildly low VitD. Start OTC supplementation. Rest wnl. MyChart msg sent.